# Patient Record
Sex: FEMALE | URBAN - METROPOLITAN AREA
[De-identification: names, ages, dates, MRNs, and addresses within clinical notes are randomized per-mention and may not be internally consistent; named-entity substitution may affect disease eponyms.]

---

## 2017-10-09 ENCOUNTER — OFFICE VISIT (OUTPATIENT)
Dept: INTERNAL MEDICINE CLINIC | Facility: CLINIC | Age: 22
End: 2017-10-09

## 2017-10-09 VITALS
HEIGHT: 69.25 IN | BODY MASS INDEX: 20.76 KG/M2 | HEART RATE: 80 BPM | DIASTOLIC BLOOD PRESSURE: 63 MMHG | WEIGHT: 141.81 LBS | SYSTOLIC BLOOD PRESSURE: 107 MMHG | TEMPERATURE: 98 F

## 2017-10-09 DIAGNOSIS — Z76.89 ESTABLISHING CARE WITH NEW DOCTOR, ENCOUNTER FOR: Primary | ICD-10-CM

## 2017-10-09 DIAGNOSIS — L70.0 ACNE VULGARIS: ICD-10-CM

## 2017-10-09 DIAGNOSIS — L21.9 SEBORRHEIC DERMATITIS OF SCALP: ICD-10-CM

## 2017-10-09 PROCEDURE — 99204 OFFICE O/P NEW MOD 45 MIN: CPT | Performed by: INTERNAL MEDICINE

## 2017-10-09 PROCEDURE — 36415 COLL VENOUS BLD VENIPUNCTURE: CPT | Performed by: INTERNAL MEDICINE

## 2017-10-09 PROCEDURE — 99212 OFFICE O/P EST SF 10 MIN: CPT | Performed by: INTERNAL MEDICINE

## 2017-10-09 RX ORDER — KETOCONAZOLE 20 MG/ML
SHAMPOO TOPICAL
Qty: 2 BOTTLE | Refills: 0 | Status: SHIPPED | OUTPATIENT
Start: 2017-10-09

## 2017-10-09 RX ORDER — DOXYCYCLINE HYCLATE 100 MG
100 TABLET ORAL 2 TIMES DAILY
Qty: 14 TABLET | Refills: 0 | Status: SHIPPED | OUTPATIENT
Start: 2017-10-09 | End: 2017-10-16

## 2017-10-09 NOTE — PROGRESS NOTES
HPI:    Patient ID: Angelica Talbot is a 25year old female. HPI she came in today to establish care with new physician.   She states that she on and off she has some glands o on the hairline at  back of her head and she used to see dermatology before they pr hallucinations and sleep disturbance. The patient is not nervous/anxious. Current Outpatient Prescriptions:  Probiotic Product (PROBIOTIC FORMULA OR) Take by mouth.  Disp:  Rfl:    Doxycycline Hyclate 100 MG Oral Tab Take 1 tablet (100 mg total) range of motion. Neck supple. No JVD present. No tracheal tenderness present. No tracheal deviation present. No thyroid mass and no thyromegaly present. Cardiovascular: Normal rate, regular rhythm, normal heart sounds and intact distal pulses.   Exam reve Sig: Apply to affected are twice a week for 4 weeks           Imaging & Referrals:  [facility] - INTERNAL        #[id number]

## 2017-10-09 NOTE — PATIENT INSTRUCTIONS
Adult Acne    If your skin is erupting with blemishes that you thought could only afflict a teenager, you may have adult acne. Acne is the term for oil-clogged pores (tiny openings on the skin) that become inflamed and form blemishes.  Adult acne blemishe The goals of treatment are to keep new acne blemishes from forming and to prevent scarring. You and your health care provider can discuss the best way to control your acne.  In most cases, acne treatment includes medications applied to the skin (topical) fo These symptoms can occur on skin:  · Around the nose  · Behind the ears  · In the beard  · In the eyebrows  · On the scalp, also known as dandruff  · On the upper chest  You may also have acne, inflamed eyelids (blepharitis), or other skin conditions at th